# Patient Record
Sex: FEMALE | Employment: FULL TIME | URBAN - METROPOLITAN AREA
[De-identification: names, ages, dates, MRNs, and addresses within clinical notes are randomized per-mention and may not be internally consistent; named-entity substitution may affect disease eponyms.]

---

## 2018-06-14 ENCOUNTER — HOSPITAL ENCOUNTER (EMERGENCY)
Facility: CLINIC | Age: 33
Discharge: HOME | End: 2018-06-14
Attending: EMERGENCY MEDICINE | Admitting: EMERGENCY MEDICINE
Payer: COMMERCIAL

## 2018-06-14 VITALS
HEART RATE: 65 BPM | RESPIRATION RATE: 16 BRPM | DIASTOLIC BLOOD PRESSURE: 68 MMHG | SYSTOLIC BLOOD PRESSURE: 115 MMHG | TEMPERATURE: 97.8 F | OXYGEN SATURATION: 98 %

## 2018-06-14 DIAGNOSIS — J45.20 MILD INTERMITTENT REACTIVE AIRWAY DISEASE WITHOUT COMPLICATION: ICD-10-CM

## 2018-06-14 PROCEDURE — 40000275 ZZH STATISTIC RCP TIME EA 10 MIN

## 2018-06-14 PROCEDURE — 25000125 ZZHC RX 250: Performed by: EMERGENCY MEDICINE

## 2018-06-14 PROCEDURE — 99283 EMERGENCY DEPT VISIT LOW MDM: CPT | Mod: 25

## 2018-06-14 PROCEDURE — 94640 AIRWAY INHALATION TREATMENT: CPT

## 2018-06-14 RX ORDER — ALBUTEROL SULFATE 5 MG/ML
2.5 SOLUTION RESPIRATORY (INHALATION) ONCE
Status: COMPLETED | OUTPATIENT
Start: 2018-06-14 | End: 2018-06-14

## 2018-06-14 RX ORDER — EPINEPHRINE 0.3 MG/.3ML
0.3 INJECTION SUBCUTANEOUS
Qty: 0.3 ML | Refills: 1 | Status: SHIPPED | OUTPATIENT
Start: 2018-06-14

## 2018-06-14 RX ORDER — ALBUTEROL SULFATE 90 UG/1
2 AEROSOL, METERED RESPIRATORY (INHALATION) EVERY 4 HOURS PRN
Qty: 1 INHALER | Refills: 0 | Status: SHIPPED | OUTPATIENT
Start: 2018-06-14

## 2018-06-14 RX ORDER — LORATADINE 10 MG/1
10 TABLET ORAL DAILY
Qty: 30 TABLET | Refills: 1 | Status: SHIPPED | OUTPATIENT
Start: 2018-06-14

## 2018-06-14 RX ADMIN — ALBUTEROL SULFATE 2.5 MG: 2.5 SOLUTION RESPIRATORY (INHALATION) at 08:58

## 2018-06-14 ASSESSMENT — ENCOUNTER SYMPTOMS
RHINORRHEA: 1
EYE ITCHING: 1
FEVER: 0
COUGH: 1
EYE REDNESS: 1

## 2018-06-14 NOTE — ED AVS SNAPSHOT
Emergency Department    [address] [facility] [address]    Phone:  [phone]    Fax:  [phone]                                       Kayley Cutler   MRN: [id number]    Department:   Emergency Department   Date of Visit:  6/14/2018           Patient Information     Date Of Birth          1985        Your diagnoses for this visit were:     Mild intermittent reactive airway disease without complication        You were seen by Angeli Page MD.      Follow-up Information     Follow up with  Emergency Department.    Specialty:  EMERGENCY MEDICINE    Why:  As needed    Contact information:    [address] [facility] [address] [phone]      Discharge References/Attachments     [facility] (ENGLISH)      24 Hour Appointment Hotline       To make an appointment at any [facility], call [phone] ([phone]). If you don't have a family doctor or clinic, we will help you find one. [place] clinics are conveniently located to serve the needs of you and your family.             Review of your medicines      START taking        Dose / Directions Last dose taken    albuterol 108 (90 Base) MCG/ACT Inhaler   Commonly known as:  PROAIR HFA   Dose:  2 puff   Quantity:  1 Inhaler        Inhale 2 puffs into the lungs every 4 hours as needed for shortness of breath / dyspnea   Refills:  0        EPINEPHrine 0.3 MG/0.3ML injection 2-pack   Commonly known as:  EPIPEN/ADRENACLICK/or ANY BX GENERIC EQUIV   Dose:  0.3 mg   Quantity:  0.3 mL        Inject 0.3 mLs (0.3 mg) into the muscle once as needed for anaphylaxis   Refills:  1          Our records show that you are taking the medicines listed below. If these are incorrect, please call your family doctor or clinic.        Dose / Directions Last dose taken    benzonatate 200 MG capsule   Commonly known as:  TESSALON   Dose:  200 mg   Quantity:  21 capsule        Take 1 capsule (200 mg) by  mouth 3 times daily as needed for cough   Refills:  0                Prescriptions were sent or printed at these locations (2 Prescriptions)                   Other Prescriptions                Printed at Department/Unit printer (2 of 2)         albuterol (PROAIR HFA) 108 (90 Base) MCG/ACT Inhaler               EPINEPHrine (EPIPEN/ADRENACLICK/OR ANY BX GENERIC EQUIV) 0.3 MG/0.3ML injection 2-pack                Orders Needing Specimen Collection     None      Pending Results     No orders found from 6/12/2018 to 6/15/2018.            Pending Culture Results     No orders found from 6/12/2018 to 6/15/2018.            Pending Results Instructions     If you had any lab results that were not finalized at the time of your Discharge, you can call the ED Lab Result RN at [phone]. You will be contacted by this team for any positive Lab results or changes in treatment. The nurses are available 7 days a week from 10A to 6:30P.  You can leave a message 24 hours per day and they will return your call.        Test Results From Your Hospital Stay               Clinical Quality Measure: Blood Pressure Screening     Your blood pressure was checked while you were in the emergency department today. The last reading we obtained was  BP: 115/68 . Please read the guidelines below about what these numbers mean and what you should do about them.  If your systolic blood pressure (the top number) is less than 120 and your diastolic blood pressure (the bottom number) is less than 80, then your blood pressure is normal. There is nothing more that you need to do about it.  If your systolic blood pressure (the top number) is 120-139 or your diastolic blood pressure (the bottom number) is 80-89, your blood pressure may be higher than it should be. You should have your blood pressure rechecked within a year by a primary care provider.  If your systolic blood pressure (the top number) is 140 or greater or your diastolic blood pressure (the  "bottom number) is 90 or greater, you may have high blood pressure. High blood pressure is treatable, but if left untreated over time it can put you at risk for heart attack, stroke, or kidney failure. You should have your blood pressure rechecked by a primary care provider within the next 4 weeks.  If your provider in the emergency department today gave you specific instructions to follow-up with your doctor or provider even sooner than that, you should follow that instruction and not wait for up to 4 weeks for your follow-up visit.        Thank you for choosing [facility]       Thank you for choosing [facility] for your care. Our goal is always to provide you with excellent care. Hearing back from our patients is one way we can continue to improve our services. Please take a few minutes to complete the written survey that you may receive in the mail after you visit with us. Thank you!        [employer] Information     [facility] lets you send messages to your doctor, view your test results, renew your prescriptions, schedule appointments and more. To sign up, go to [email or web address] . Click on \"Log in\" on the left side of the screen, which will take you to the Welcome page. Then click on \"Sign up Now\" on the right side of the page.     You will be asked to enter the access code listed below, as well as some personal information. Please follow the directions to create your username and password.     Your access code is: [email or web address]  Expires: 2018  9:20 AM     Your access code will  in 90 days. If you need help or a new code, please call your [facility] clinic or [phone].        Care EveryWhere ID     This is your Care EveryWhere ID. This could be used by other organizations to access your [facility] medical records  [id number]        Equal Access to Services     MICHEL LACEY : Antony Tapia, nigel caldera, dana almanza. So rashida " [phone].    ATENCIÓN: Si habla español, tiene a locke disposición servicios gratuitos de asistencia lingüística. Llame al [phone].    We comply with applicable federal civil rights laws and [place] laws. We do not discriminate on the basis of race, color, national origin, age, disability, sex, sexual orientation, or gender identity.            After Visit Summary       This is your record. Keep this with you and show to your community pharmacist(s) and doctor(s) at your next visit.

## 2018-06-14 NOTE — ED AVS SNAPSHOT
Emergency Department    [address] [facility] [address]    Phone:  [phone]    Fax:  [phone]                                       Kayley Cutler   MRN: [id number]    Department:   Emergency Department   Date of Visit:  6/14/2018           After Visit Summary Signature Page     I have received my discharge instructions, and my questions have been answered. I have discussed any challenges I see with this plan with the nurse or doctor.    ..........................................................................................................................................  Patient/Patient Representative Signature      ..........................................................................................................................................  Patient Representative Print Name and Relationship to Patient    ..................................................               ................................................  Date                                            Time    ..........................................................................................................................................  Reviewed by Signature/Title    ...................................................              ..............................................  Date                                                            Time

## 2018-06-14 NOTE — ED PROVIDER NOTES
History     Chief Complaint:  Cough    HPI   Kayley Cutler is a 33 year old female with a history of allergies who presents with cough. The patient states that she woke up this morning and she was having difficulty breathing and a dry cough. The patient notes also having a rhinorrhea and itchy, red eyes. The patient notes no fever, recent travel, or calf swelling or pain. The patient notes that she took 1 Cetirizine, and after feeling no relief she came into the ED. Of note: the patient states that she just moved to a new location that appears to be very [place].     Allergies:  Acetaminophen   Nsaids     Medications:    Cetirizine   Benzonatate     Past Medical History:    Allergies     Past Surgical History:    Surgical history reviewed. No pertinent surgical history.     Family History:    Family history reviewed. No pertinent family history.     Social History:  Smoking Status: Never Smoker  Marital Status:  Single     Review of Systems   Constitutional: Negative for fever.   HENT: Positive for rhinorrhea.    Eyes: Positive for redness and itching.   Respiratory: Positive for cough.         Difficulty breathing    Musculoskeletal:        No calf pain or swelling.    All other systems reviewed and are negative.      Physical Exam     Patient Vitals for the past 24 hrs:   BP Temp Temp src Pulse Resp SpO2   06/14/18 0858 - - - - - 98 %   06/14/18 0842 115/68 97.8  F (36.6  C) Oral 65 16 -   06/14/18 0836 - - - - 16 97 %       Physical Exam  General/Appearance: appears stated age, well-groomed, appears comfortable  Eyes: EOMI, no scleral injection, no icterus  ENT: MMM  Neck: supple, nl ROM, no stiffness  Cardiovascular: RRR, nl S1S2, no m/r/g, 2+ pulses in all 4 extremities, cap refill <2sec  Respiratory: CTAB, good air movement throughout, no wheezes/rhonchi/rales, no increased WOB, no retractions, intermittent dry cough      Emergency Department Course   Interventions:  0858 Albuterol 2.5 mg  "Nebulization     Emergency Department Course:    0831 Nursing notes and vitals reviewed.    0837 I performed an exam of the patient as documented above.     0919  I returned to check on the patient and she notes feeling better.     0941 I personally answered all related questions prior to discharge.    Impression & Plan      Medical Decision Making:  Kayley Cutler is a 33 year old female who presents to the emergency department today for evaluation of she has had other symptoms consistent with an allergic reaction.  We tried an albuterol neb which resolved her cough and her sense of shortness of breath.  She did feel slightly \"jittery\" from it however this is an expected side effect.  Will prescribe albuterol as needed to use at home as well as Claritin.  She is asking for an EpiPen to use as needed if she gets severe.    Diagnosis:    ICD-10-CM    1. Mild intermittent reactive airway disease without complication J45.20      Disposition:   The patient is discharged to home.    Discharge Medications:  New Prescriptions    ALBUTEROL (PROAIR HFA) 108 (90 BASE) MCG/ACT INHALER    Inhale 2 puffs into the lungs every 4 hours as needed for shortness of breath / dyspnea    EPINEPHRINE (EPIPEN/ADRENACLICK/OR ANY BX GENERIC EQUIV) 0.3 MG/0.3ML INJECTION 2-PACK    Inject 0.3 mLs (0.3 mg) into the muscle once as needed for anaphylaxis    LORATADINE (CLARITIN) 10 MG TABLET    Take 1 tablet (10 mg) by mouth daily     Scribe Disclosure:  I, Padmini Reveles, am serving as a scribe at 8:32 AM on 6/14/2018 to document services personally performed by Angeli Page based on my observations and the provider's statements to me.   EMERGENCY DEPARTMENT       Angeli Page MD  06/14/18 1420    "

## 2021-03-30 ENCOUNTER — HOSPITAL ENCOUNTER (EMERGENCY)
Facility: CLINIC | Age: 36
Discharge: HOME | End: 2021-03-30
Attending: EMERGENCY MEDICINE | Admitting: EMERGENCY MEDICINE
Payer: COMMERCIAL

## 2021-03-30 VITALS
DIASTOLIC BLOOD PRESSURE: 83 MMHG | HEIGHT: 60 IN | WEIGHT: 127 LBS | HEART RATE: 71 BPM | TEMPERATURE: 97.9 F | RESPIRATION RATE: 16 BRPM | BODY MASS INDEX: 24.94 KG/M2 | SYSTOLIC BLOOD PRESSURE: 118 MMHG | OXYGEN SATURATION: 100 %

## 2021-03-30 DIAGNOSIS — T62.2X1A TOXIC EFFECT OF OTHER INGESTED (PARTS OF) PLANT(S), ACCIDENTAL (UNINTENTIONAL), INITIAL ENCOUNTER: ICD-10-CM

## 2021-03-30 DIAGNOSIS — R11.2 NON-INTRACTABLE VOMITING WITH NAUSEA, UNSPECIFIED VOMITING TYPE: ICD-10-CM

## 2021-03-30 LAB — INTERPRETATION ECG - MUSE: NORMAL

## 2021-03-30 PROCEDURE — 93005 ELECTROCARDIOGRAM TRACING: CPT

## 2021-03-30 PROCEDURE — 250N000011 HC RX IP 250 OP 636: Performed by: EMERGENCY MEDICINE

## 2021-03-30 PROCEDURE — 99283 EMERGENCY DEPT VISIT LOW MDM: CPT

## 2021-03-30 RX ORDER — ONDANSETRON 4 MG/1
4 TABLET, ORALLY DISINTEGRATING ORAL ONCE
Status: COMPLETED | OUTPATIENT
Start: 2021-03-30 | End: 2021-03-30

## 2021-03-30 RX ADMIN — ONDANSETRON 4 MG: 4 TABLET, ORALLY DISINTEGRATING ORAL at 15:50

## 2021-03-30 ASSESSMENT — ENCOUNTER SYMPTOMS: VOMITING: 1

## 2021-03-30 ASSESSMENT — MIFFLIN-ST. JEOR: SCORE: 1201.32

## 2021-03-30 NOTE — DISCHARGE INSTRUCTIONS
Return to the ED if you develop worsening symptoms including vomiting, lightheadedness, very slow pulse, or confusion.  Given your mild symptoms, I expect that you will recover without any issues.

## 2021-03-30 NOTE — ED PROVIDER NOTES
"  History   Chief Complaint:  Ingestion     The history is provided by the patient.      Kayley Cutler is a 36 year old female who presents with ingestion. The patient tells us that around 1230 she ate a stir palacio that was made with what she thought was a wild onion but they later found out was Death [place]. Since then the patient has had two episodes of vomiting and has a stomach ache. Patient denies fever, diarrhea or over-salivating.               Review of Systems   Gastrointestinal: Positive for vomiting.   All other systems reviewed and are negative.    Allergies:  Acetaminophen  Nsaids    Medications:  Proair  Tessalon   Claritin    Past Medical History:    Discogenic low back pain  Muscular deconditioning  Chronic cervical pain     Past Surgical History:    The patient denies past surgical history.     Family History:    The patient denies past family history.     Social History:  The patient is a nun.   The patient presents to the ED with a fellow nun.     Physical Exam     Patient Vitals for the past 24 hrs:   BP Temp Temp src Pulse Resp SpO2 Height Weight   03/30/21 1419 108/54 97.9  F (36.6  C) Temporal 68 16 100 % 1.53 m (5' 0.24\") 57.6 kg (127 lb)       Physical Exam  General: alert, well appearing  HENT: mucous membranes moist  CV: regular rate, regular rhythm  Resp: normal effort, clear throughout, no crackles or wheezing  GI: abdomen soft and nontender, no guarding  Skin: appropriately warm and dry  Neuro: alert, clear speech, oriented  Psych: normal mood and affect    Emergency Department Course   ECG  ECG taken at 1507, ECG read at 1527  Sinus bradycardia with sinus arrhythmia. Incomplete right bundle branch block. Borderline ECG.   Rate 56 bpm. MO interval 126 ms. QRS duration 98 ms. QT/QTc 426/411 ms. P-R-T axes 82 84 87.     Emergency Department Course:    Reviewed:  I reviewed nursing notes, vitals, past medical history and care everywhere    Assessments:  1526 I obtained history and " examined the patient as noted above.   1712 I rechecked the patient and explained findings.     Interventions:  1550 Zofran 4 mg PO    Disposition:  The patient was discharged to home.     Impression & Plan     Medical Decision Making:  Kayely Freeman year old otherwise healthy female who presents after possible ingestion of death ruth plant. This occurred along with 9 other individuals who are in the same [employer] community. All the patients had nearly identical symptoms. Fortunately, Kayley's presentation appears to be consistent with very mild toxicity. Here in the ED, vitals have been stable. There is no prolonged QT on EKG. She had a mild nausea which was alleviated with Zofran. At this point, she is able to tolerate oral fluids and crackers. Unfortunately I am unable to confirm with certainty that this was death ruth ingestion however, symptoms seem to suggest it and association of it being confused with wild onion makes it likely as well. Based on poison control recommendations, she was observed for a total of four hours. At this time I believe she is safe for discharge given improvement of symptoms. She will return to the ED for worsening symptoms including nausea, vomiting, lightheadedness or syncope.      Diagnosis:    ICD-10-CM    1. Toxic effect of other ingested (parts of) plant(s), accidental (unintentional), initial encounter  T62.2X1A    2. Non-intractable vomiting with nausea, unspecified vomiting type  R11.2        Discharge Medications:  New Prescriptions    No medications on file       Scribe Disclosure:  I, Zenon Pamela, am serving as a scribe at 3:26 PM on 3/30/2021 to document services personally performed by Jess Calloway MD, based on my observations and the provider's statements to me.            Jess Calloway MD  03/31/21 3870